# Patient Record
Sex: FEMALE | Race: WHITE | NOT HISPANIC OR LATINO
[De-identification: names, ages, dates, MRNs, and addresses within clinical notes are randomized per-mention and may not be internally consistent; named-entity substitution may affect disease eponyms.]

---

## 2021-04-29 PROBLEM — Z00.00 ENCOUNTER FOR PREVENTIVE HEALTH EXAMINATION: Status: ACTIVE | Noted: 2021-04-29

## 2021-05-06 ENCOUNTER — APPOINTMENT (OUTPATIENT)
Dept: GYNECOLOGIC ONCOLOGY | Facility: CLINIC | Age: 41
End: 2021-05-06

## 2021-10-14 ENCOUNTER — APPOINTMENT (OUTPATIENT)
Dept: GYNECOLOGIC ONCOLOGY | Facility: CLINIC | Age: 41
End: 2021-10-14
Payer: COMMERCIAL

## 2021-10-14 ENCOUNTER — TRANSCRIPTION ENCOUNTER (OUTPATIENT)
Age: 41
End: 2021-10-14

## 2021-10-14 VITALS
HEART RATE: 116 BPM | WEIGHT: 272 LBS | HEIGHT: 63 IN | BODY MASS INDEX: 48.2 KG/M2 | OXYGEN SATURATION: 98 % | DIASTOLIC BLOOD PRESSURE: 93 MMHG | SYSTOLIC BLOOD PRESSURE: 136 MMHG

## 2021-10-14 DIAGNOSIS — Z78.9 OTHER SPECIFIED HEALTH STATUS: ICD-10-CM

## 2021-10-14 DIAGNOSIS — Z80.3 FAMILY HISTORY OF MALIGNANT NEOPLASM OF BREAST: ICD-10-CM

## 2021-10-14 DIAGNOSIS — N93.9 ABNORMAL UTERINE AND VAGINAL BLEEDING, UNSPECIFIED: ICD-10-CM

## 2021-10-14 PROCEDURE — 99204 OFFICE O/P NEW MOD 45 MIN: CPT

## 2021-10-19 NOTE — CHIEF COMPLAINT
[Spouse] : spouse [FreeTextEntry1] : [facility]\par [place] Office\par [address] [facility]\[place] [address]\par

## 2021-10-19 NOTE — END OF VISIT
[FreeTextEntry3] : Written by Senait Maya PA-C, acting as a scribe for Dr. Preston Moses.\par This note accurately reflects the work and decisions made by me.\par

## 2021-10-19 NOTE — PHYSICAL EXAM
[Normal] : Bimanual Exam: Normal [de-identified] : Patient was seen and examined with chaperone Senait Maya PA-C.

## 2021-10-19 NOTE — HISTORY OF PRESENT ILLNESS
[FreeTextEntry1] : 42 yo  via 2 C/S LMP 10/3/21 self referred for ovarian cyst, LSIL. Patient reports she had presented for her annual exam, pap smear from  with ASCUS, colposcopy with 12:00 biopsy with endocervical and squamous mucosa with moderate chronic inflammation, reactive changes, and focal atypia, cannot entirely rule out low-grade squamous intraepithelial lesion (LSIL). ECC with endocervical mucosa with moderate to severe acute inflammation, and reactive changes. EMB non diagnostic. US performed 21 with right ovarian cyst measuring 4.5 x 3.4 x 3 cm. She had seen Dr Antoine ([place]) who recommended surgical removal of ovarian cyst with FS possible laparotomy. She has not had tumor markers drawn, CA-125, CEA and CA 19-9 ordered and has appointment Saturday 10/16. She reports starting in February her menses have been irregular, she reports menses used to occur every 28-30 days with 4-6 days of bleeding, but starting in February she had very heavy vaginal bleeding, with consistent VB x 4 weeks followed by 1.5 months without bleeding. \par \par Patient reports history of ovarian cysts diagnosed 25 years ago. \par \par Lpap: ASCUS\par Lmammo: 3/2020 WNL per pt, she has script\par

## 2021-10-19 NOTE — ASSESSMENT
[FreeTextEntry1] : 42 yo female with R ovarian cyst on US from 4/2021 and LSIL on cervical biopsy. Discussed in regards to her cervical dysplasia she should have repeat pap smear in 6 months-1 year. In regards to her ovarian cyst, I would like to evaluate with MRI as US is outdated and unclear in terms of morphology. She has tumor marker script and will have labs drawn this Saturday 10/16/21. She will bring the result with her when she follows up in 1-2 weeks. Patient expressed her concern with her previous physician and is hesitant to have surgery. I advised her based on MRI results I may be recommending a similar if not identical procedure as was previously recommended to her. She understands there may be a need for discussion regarding surgical intervention in the near future, depending on results from MRI.

## 2021-11-08 ENCOUNTER — APPOINTMENT (OUTPATIENT)
Dept: MRI IMAGING | Facility: CLINIC | Age: 41
End: 2021-11-08
Payer: COMMERCIAL

## 2021-11-08 ENCOUNTER — OUTPATIENT (OUTPATIENT)
Dept: OUTPATIENT SERVICES | Facility: HOSPITAL | Age: 41
LOS: 1 days | End: 2021-11-08

## 2021-11-08 DIAGNOSIS — N93.9 ABNORMAL UTERINE AND VAGINAL BLEEDING, UNSPECIFIED: ICD-10-CM

## 2021-11-08 PROCEDURE — 72197 MRI PELVIS W/O & W/DYE: CPT | Mod: 26

## 2021-11-11 PROBLEM — R87.610 ATYPICAL SQUAMOUS CELLS OF UNDETERMINED SIGNIFICANCE (ASCUS) ON PAPANICOLAOU SMEAR OF CERVIX: Status: ACTIVE | Noted: 2021-10-14

## 2021-11-12 ENCOUNTER — APPOINTMENT (OUTPATIENT)
Dept: GYNECOLOGIC ONCOLOGY | Facility: CLINIC | Age: 41
End: 2021-11-12
Payer: COMMERCIAL

## 2021-11-12 VITALS
WEIGHT: 272 LBS | RESPIRATION RATE: 18 BRPM | DIASTOLIC BLOOD PRESSURE: 119 MMHG | SYSTOLIC BLOOD PRESSURE: 156 MMHG | HEIGHT: 63 IN | BODY MASS INDEX: 48.2 KG/M2 | OXYGEN SATURATION: 95 % | HEART RATE: 93 BPM

## 2021-11-12 DIAGNOSIS — R87.610 ATYPICAL SQUAMOUS CELLS OF UNDETERMINED SIGNIFICANCE ON CYTOLOGIC SMEAR OF CERVIX (ASC-US): ICD-10-CM

## 2021-11-12 PROCEDURE — 99214 OFFICE O/P EST MOD 30 MIN: CPT

## 2021-11-19 ENCOUNTER — APPOINTMENT (OUTPATIENT)
Dept: GYNECOLOGIC ONCOLOGY | Facility: CLINIC | Age: 41
End: 2021-11-19
Payer: COMMERCIAL

## 2021-11-19 PROCEDURE — 99214 OFFICE O/P EST MOD 30 MIN: CPT

## 2021-11-22 NOTE — PHYSICAL EXAM
[Normal] : Mood and affect: Normal [de-identified] : Karla Juan Medical assistant chaperoned during results and discussion.

## 2021-11-22 NOTE — ASSESSMENT
[FreeTextEntry1] : I discussed with patient her MRI and blood test results, Patients blood test was wnl. I reviewed patient MRI and advised her that she has a right complex cyst and I am recommending surgical intervention. Options of surgical intervention included Lap BS right oophorectomy vs RA TLH BS, right oophorectomy. I advised the patient that if she just removes her tubes and right ovary and there is an abnormality, she will likely need to proceed with a hysterectomy. I recommended patient think about these options and return in 1 week to discuss final decision. Patient stated she understood and agreed to comply.

## 2021-11-22 NOTE — HISTORY OF PRESENT ILLNESS
[FreeTextEntry1] : 42 yo female with R ovarian cyst on US from 4/2021 and LSIL on cervical biopsy. Discussed w/ pt in regards to her cervical dysplasia she should have repeat pap smear in 6 months-1 year. In regards to her ovarian cyst, I wanted to evaluate with MRI as US is outdated and unclear in terms of morphology. She had tumor marker script and was planned to have labs drawn 10/16/21. She was advised to bring in results with her when she followed up. Patient expressed her concern with her previous physician and is hesitant to have surgery. I advised her based on MRI results I may be recommending a similar if not identical procedure as was previously recommended to her. She understood there may be a need for discussion regarding surgical intervention in the near future, depending on results from MRI. Patient returns to the office today to discuss results. \par \par MRI pelvis -11/8/21 revealed 4.0 x 3.7 x 4.2cm complex right ovarian cystic mass with evidence of enhancing peripheral nodularity along the superior margin. There is no evidence of ascites or peritoneal implants. No difinable pelvic adenopathy seen. \par Tumor markers: 10/16/21 CEA <0.5. Ca19-9: 3 Ca125: 25\par \par

## 2021-11-22 NOTE — REASON FOR VISIT
[FreeTextEntry1] : [place] Location \par \par [facility] [place]. [phone]\par [address] [place] [address]

## 2021-11-22 NOTE — END OF VISIT
[FreeTextEntry3] : Written by Karla Juan, acting as a scribe for Dr. Preston Moses \par This note accurately reflects the work and decisions made by me.

## 2021-11-29 NOTE — REASON FOR VISIT
[Spouse] : spouse [FreeTextEntry1] : [place] Location \par \par [facility] [place]. [phone]\par [address] [place] [address]

## 2021-11-29 NOTE — HISTORY OF PRESENT ILLNESS
[FreeTextEntry1] : This 42 y/o w/ a right ovarian complex cyst was recently seen on 11/12/21 to review MRI pelvis and blood test results. MRI pelvis -11/8/21 revealed 4.0 x 3.7 x 4.2cm complex right ovarian cystic mass with evidence of enhancing peripheral nodularity along the superior margin. There is no evidence of ascites or peritoneal implants. No difinable pelvic adenopathy seen. Tumor markers: 10/16/21 CEA <0.5. Ca 19-9: 3 Ca 125: 25. I discussed with patient the need for surgical intervention w/ the option of a Lap BS, right oophorectomy w/ the possible need of further surgical intervention if pathology is abnormal vs a RA TLH BS right oophorectomy. Patient had previously  expressed her concern with her previous physician and is hesitant to have surgery. I advised the patient to think over her options, patient returns to the office today to discuss her final decision. \par \par

## 2021-11-29 NOTE — PHYSICAL EXAM
[Normal] : Mood and affect: Normal [de-identified] : Karla Juan Medical assistant chaperoned during discussion.

## 2021-11-29 NOTE — ASSESSMENT
[FreeTextEntry1] : I discussed at length with the patient the nature, purpose, risks, benefits, and alternatives of Robot assisted total laparoscopic hysterectomy with bilateral salpingectomy, right oophorectomy, possible left oophorectomy, and possible staging (including possible omentectomy).  The patient understands the risks to include,but not be limited to, bowel injury, bleeding (with the possible need for transfusion), bladder or ureteral injury, infections, deep venous thrombosis, and navjot-operative death.  The patient also understands that her surgery may not be able to be performed robotically and that she may need a laparotomy.  She also understands the limitations of robotic surgery and the possibility of missing a surgical complication with need for subsequent re-exploration.  She agrees to proceed.  She asked numerous questions which were answered to her satisfaction.  She understands the need for a pre-operative bowel preparation and agrees to comply with our instructions.  She also understands the rationale for a cystoscopy at the completion of the procedure and the potential risks of cystoscopy.  The patient also understands the possible limitations of robotic staging compared to a laparotomy approach. \par \par Discussed with patient how I like to incorporate a "family email" which consists of a family member or friend of the patient emailing me a short synopsis of who this patient is as a person, their likes or dislikes, their favorite song or movie, etc. This email will be read out loud in the operating room and it has allowed my operating team to connect with the patient on a more personal level. Patient was given my email address.

## 2021-12-03 ENCOUNTER — FORM ENCOUNTER (OUTPATIENT)
Age: 41
End: 2021-12-03

## 2021-12-04 ENCOUNTER — OUTPATIENT (OUTPATIENT)
Dept: OUTPATIENT SERVICES | Facility: HOSPITAL | Age: 41
LOS: 1 days | End: 2021-12-04

## 2021-12-04 VITALS
DIASTOLIC BLOOD PRESSURE: 90 MMHG | HEIGHT: 64 IN | WEIGHT: 281.97 LBS | OXYGEN SATURATION: 99 % | RESPIRATION RATE: 16 BRPM | SYSTOLIC BLOOD PRESSURE: 130 MMHG | TEMPERATURE: 98 F | HEART RATE: 96 BPM

## 2021-12-04 DIAGNOSIS — Z98.890 OTHER SPECIFIED POSTPROCEDURAL STATES: Chronic | ICD-10-CM

## 2021-12-04 DIAGNOSIS — Z29.9 ENCOUNTER FOR PROPHYLACTIC MEASURES, UNSPECIFIED: ICD-10-CM

## 2021-12-04 DIAGNOSIS — Z98.891 HISTORY OF UTERINE SCAR FROM PREVIOUS SURGERY: Chronic | ICD-10-CM

## 2021-12-04 DIAGNOSIS — Z92.29 PERSONAL HISTORY OF OTHER DRUG THERAPY: ICD-10-CM

## 2021-12-04 DIAGNOSIS — R19.00 INTRA-ABDOMINAL AND PELVIC SWELLING, MASS AND LUMP, UNSPECIFIED SITE: ICD-10-CM

## 2021-12-04 DIAGNOSIS — Z13.89 ENCOUNTER FOR SCREENING FOR OTHER DISORDER: ICD-10-CM

## 2021-12-04 DIAGNOSIS — Z98.51 TUBAL LIGATION STATUS: Chronic | ICD-10-CM

## 2021-12-04 DIAGNOSIS — Z01.818 ENCOUNTER FOR OTHER PREPROCEDURAL EXAMINATION: ICD-10-CM

## 2021-12-04 DIAGNOSIS — E66.01 MORBID (SEVERE) OBESITY DUE TO EXCESS CALORIES: ICD-10-CM

## 2021-12-04 LAB
A1C WITH ESTIMATED AVERAGE GLUCOSE RESULT: 5.7 % — HIGH (ref 4–5.6)
ANION GAP SERPL CALC-SCNC: 11 MMOL/L — SIGNIFICANT CHANGE UP (ref 5–17)
APTT BLD: 30.8 SEC — SIGNIFICANT CHANGE UP (ref 27.5–35.5)
BASOPHILS # BLD AUTO: 0.03 K/UL — SIGNIFICANT CHANGE UP (ref 0–0.2)
BASOPHILS NFR BLD AUTO: 0.3 % — SIGNIFICANT CHANGE UP (ref 0–2)
BLD GP AB SCN SERPL QL: SIGNIFICANT CHANGE UP
BUN SERPL-MCNC: 13.4 MG/DL — SIGNIFICANT CHANGE UP (ref 8–20)
CALCIUM SERPL-MCNC: 9.7 MG/DL — SIGNIFICANT CHANGE UP (ref 8.6–10.2)
CHLORIDE SERPL-SCNC: 102 MMOL/L — SIGNIFICANT CHANGE UP (ref 98–107)
CO2 SERPL-SCNC: 24 MMOL/L — SIGNIFICANT CHANGE UP (ref 22–29)
CREAT SERPL-MCNC: 0.83 MG/DL — SIGNIFICANT CHANGE UP (ref 0.5–1.3)
EOSINOPHIL # BLD AUTO: 0.01 K/UL — SIGNIFICANT CHANGE UP (ref 0–0.5)
EOSINOPHIL NFR BLD AUTO: 0.1 % — SIGNIFICANT CHANGE UP (ref 0–6)
ESTIMATED AVERAGE GLUCOSE: 117 MG/DL — HIGH (ref 68–114)
GLUCOSE SERPL-MCNC: 112 MG/DL — HIGH (ref 70–99)
HCG SERPL-ACNC: <4 MIU/ML — SIGNIFICANT CHANGE UP
HCT VFR BLD CALC: 41.2 % — SIGNIFICANT CHANGE UP (ref 34.5–45)
HGB BLD-MCNC: 13 G/DL — SIGNIFICANT CHANGE UP (ref 11.5–15.5)
IMM GRANULOCYTES NFR BLD AUTO: 0.3 % — SIGNIFICANT CHANGE UP (ref 0–1.5)
INR BLD: 0.93 RATIO — SIGNIFICANT CHANGE UP (ref 0.88–1.16)
LYMPHOCYTES # BLD AUTO: 1.91 K/UL — SIGNIFICANT CHANGE UP (ref 1–3.3)
LYMPHOCYTES # BLD AUTO: 16.2 % — SIGNIFICANT CHANGE UP (ref 13–44)
MCHC RBC-ENTMCNC: 26.4 PG — LOW (ref 27–34)
MCHC RBC-ENTMCNC: 31.6 GM/DL — LOW (ref 32–36)
MCV RBC AUTO: 83.7 FL — SIGNIFICANT CHANGE UP (ref 80–100)
MONOCYTES # BLD AUTO: 0.6 K/UL — SIGNIFICANT CHANGE UP (ref 0–0.9)
MONOCYTES NFR BLD AUTO: 5.1 % — SIGNIFICANT CHANGE UP (ref 2–14)
NEUTROPHILS # BLD AUTO: 9.17 K/UL — HIGH (ref 1.8–7.4)
NEUTROPHILS NFR BLD AUTO: 78 % — HIGH (ref 43–77)
PLATELET # BLD AUTO: 303 K/UL — SIGNIFICANT CHANGE UP (ref 150–400)
POTASSIUM SERPL-MCNC: 4 MMOL/L — SIGNIFICANT CHANGE UP (ref 3.5–5.3)
POTASSIUM SERPL-SCNC: 4 MMOL/L — SIGNIFICANT CHANGE UP (ref 3.5–5.3)
PROTHROM AB SERPL-ACNC: 10.8 SEC — SIGNIFICANT CHANGE UP (ref 10.6–13.6)
RBC # BLD: 4.92 M/UL — SIGNIFICANT CHANGE UP (ref 3.8–5.2)
RBC # FLD: 13.9 % — SIGNIFICANT CHANGE UP (ref 10.3–14.5)
SODIUM SERPL-SCNC: 137 MMOL/L — SIGNIFICANT CHANGE UP (ref 135–145)
WBC # BLD: 11.76 K/UL — HIGH (ref 3.8–10.5)
WBC # FLD AUTO: 11.76 K/UL — HIGH (ref 3.8–10.5)

## 2021-12-04 RX ORDER — ESCITALOPRAM OXALATE 10 MG/1
0 TABLET, FILM COATED ORAL
Qty: 0 | Refills: 0 | DISCHARGE

## 2021-12-04 RX ORDER — ERGOCALCIFEROL 1.25 MG/1
0 CAPSULE ORAL
Qty: 0 | Refills: 0 | DISCHARGE

## 2021-12-04 RX ORDER — ONDANSETRON 8 MG/1
0 TABLET, FILM COATED ORAL
Qty: 0 | Refills: 0 | DISCHARGE

## 2021-12-04 RX ORDER — SCOPALAMINE 1 MG/3D
0 PATCH, EXTENDED RELEASE TRANSDERMAL
Qty: 0 | Refills: 1 | DISCHARGE

## 2021-12-04 NOTE — H&P PST ADULT - NSANTHOSAYNRD_GEN_A_CORE
No. GERHARD screening performed.  STOP BANG Legend: 0-2 = LOW Risk; 3-4 = INTERMEDIATE Risk; 5-8 = HIGH Risk

## 2021-12-04 NOTE — H&P PST ADULT - PULMONARY EMBOLUS
Chief Complaint   Patient presents with   • Consultation     np consult. Pt would like to discuss Warfarin. Discuss rash under breast. Discuss spot on right arm that wont heal.        History Of Present Illness  Nia is a 87 year old female presenting  For established care.  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  Has history of pulmonary embolism & emphysema currently on home oxygen.  On warfarin.  Stated that she do have history of vena cava filter.  Last INR was 2.1 and she is taking Coumadin.  Of high blood pressure her blood pressure is mildly elevated but stated it is usually in the range of 130 / 70 at home.  Currently denied any chest pain.  Patient is concerned about a rash under her breasts, itchy, stated the rash under her left breast is more prominent than the right one ..  There is a  small lesion on her right forearm concerned about it.  That she has history of skipped beats and following cardiologist at [place] but currently denied any heart issues like coronary artery disease or any history of CHF heart attack.  Stated  that his respiratory status is well controlled with the inhalers.            Past Medical History  Past Medical History:   Diagnosis Date   • Abdominal tumor    • Emphysema of lung (CMS/HCC)    • Gastric ulcer    • Hypertension    • Osteoporosis    • Pulmonary embolism (CMS/HCC)         Surgical History  Past Surgical History:   Procedure Laterality Date   • Eye surgery     • Stent implant     • Tonsillectomy and adenoidectomy          Social History  Social History     Tobacco Use   • Smoking status: Former Smoker   • Smokeless tobacco: Never Used   Substance Use Topics   • Alcohol use: Yes     Frequency: 4 or more times a week     Drinks per session: 1 or 2     Binge frequency: Never   • Drug use: Never       Family History  History reviewed. No pertinent family history.     Allergies  ALLERGIES:  Patient has no known  allergies.    Medications  Current Outpatient Medications   Medication   • ProAir RespiClick 108 (90 Base) MCG/ACT inhaler   • ALPRAZolam (XANAX) 0.25 MG tablet   • Trelegy Ellipta 100-62.5-25 MCG/INH inhaler   • losartan-hydrochlorothiazide (HYZAAR) 100-25 MG per tablet   • montelukast (SINGULAIR) 10 MG tablet   • pantoprazole (PROTONIX) 40 MG tablet   • warfarin (COUMADIN) 1 MG tablet (warfarin)   • warfarin (COUMADIN) 3 MG tablet   • Ferrous Sulfate (Iron) 325 (65 Fe) MG Tab   • vitamin B-12 (CYANOCOBALAMIN) 1000 MCG tablet   • Thiamine HCl (vitamin B-1) 100 MG tablet   • Ascorbic Acid (vitamin C) 500 MG tablet   • Cholecalciferol (vitamin D3) 10 mcg (400 units) tablet   • clotrimazole-betamethasone (LOTRISONE) 1-0.05 % cream     No current facility-administered medications for this visit.        There is no problem list on file for this patient.       Review of Systems    CONSTITUTIONAL: No unwanted weight change , fever , chills or malaise .  EYES: No change in vision , blurred or double vision.  ENT:No problem  hearing,chewing, swallowing.  RESPIRATORY:No cough CV:No chest pain or pressure,GR,Orthopnea, PND ,or palpitations.  GI:No change in bowel habits, no nausea ,vomiting or diarrhea  :No dysuria, hematuria, nocturia,urgency or frequency  MUSCULO-SKELETAL:hx of arthritis   SKIN:No rash  CNS:No problems with  Baseline co ordination , balance , vision , strength, numbness or tingling .  PSYCH:No depression /anxiety. Suicidal thoughts.    Last Recorded Vitals    Blood pressure (!) 162/60, pulse 64, temperature 98.6 °F (37 °C), temperature source Temporal, resp. rate 14, height 5' 5\" (1.651 m), weight 90.7 kg (200 lb).  Physical  General:Well oriented in time /place /person    HEENT: Conjunctiva clearLUNGS:LUNGS CLEAR , NORMAL BREATH SOUNDS , No wheezing ,rales or Rhonchi noted .  CARDIOVASCULAR:RRR, Heart sounds normal   ABDOMEN: Abdomen Soft , non tender , positive bowel sounds in all quadrant noted    EXTREMITIES:No clubbing , cyanosis or edema noted  MUSCULOSKELETAL:ROM restricted at back   SKIN:UNder left breast , there is erythematous rash , mild rash under rt breast also noted .   NEUROLOGICAL:Intact , Mental status normal , Gait normal     Assessment and Plan:    Encounter to establish care  Med hx reviewed     Pulmonary emphysema, unspecified emphysema type (CMS/HCC)  rec   - SERVICE TO PULMONARY MEDICINE  - CBC WITH DIFFERENTIAL  - COMPREHENSIVE METABOLIC PANEL    Skipped heart beats  rec cardiology   - SERVICE TO CARDIOLOGY    Other pulmonary embolism without acute cor pulmonale, unspecified chronicity (CMS/[facility])    - SERVICE TO PULMONARY MEDICINE  - POCT PT/INR    Essential hypertension  bp log book     Skin lesion  rec vaseline     Rash  Add clotrimazole / betamethasone   - SERVICE TO DERMATOLOGY  INR: INR TODAY 2 , CONTINUE CURRENT DOSE   RECHECK IN ONE WEEK  Home bound needs  inr monitoring .           Time spent with patient 40  minutes.Duration of counseling and/or coordination of care (greater than 50%).Specifically,we discussed the patient's diagnosis,prognosis,and treatment/management options as documented in the A/P.    Vinnie Pendleton MD  Internal Medicine  [facility]  Phone: [phone]  Fax: [phone]   no

## 2021-12-04 NOTE — H&P PST ADULT - ASSESSMENT
This is a  This is a morbidly obese 41 year old female in [place]   LMP 21 with history of ovarian cyst, presents today for PST c/o irregular menses and abnormal PAP smear. Patient states about 1.5 years ago she started having irregular menstrual cycles. Reports  prior menses used to occur every 28-30 days with 4-6 days of bleeding, but starting in February she had very heavy vaginal bleeding, lasting  4 weeks followed by 1.5 months without bleeding. States over the summer her cycle started to become more regulated. Patient reports during her annual exam, her pap smear was positive for ASCUS. A colposcopy with biopsy performed 2021 , states results were inconclusive. MRI pelvis 21 IMPRESSION: 4.0 x 3.7 x 4.2cm complex right ovarian cystic mass with evidence of enhancing peripheral nodularity along the superior margin. There is no evidence of ascites or peritoneal implants. No definable pelvic adenopathy seen. Denies current vaginal bleeding. Denies pelvic pain, dyspareunia or post coital bleeding. She is now scheduled for robotic assisted total laparoscopic, hysterectomy, bilateral salpingectomy, right oophorectomy, frozen section, possible left oophorectomy, cystoscopy with Dr. Moses on 21.     CAPRINI SCORE    AGE RELATED RISK FACTORS                                                             [X ] Age 41-60 years                                            (1 Point)  [ ] Age: 61-74 years                                           (2 Points)                 [ ] Age= 75 years                                                (3 Points)             DISEASE RELATED RISK FACTORS                                                       [ ] Edema in the lower extremities                 (1 Point)                     [ ] Varicose veins                                               (1 Point)                                 [X ] BMI > 25 Kg/m2                                            (1 Point)                                  [ ] Serious infection (ie PNA)                            (1 Point)                     [ ] Lung disease ( COPD, Emphysema)            (1 Point)                                                                          [ ] Acute myocardial infarction                         (1 Point)                  [ ] Congestive heart failure (in the previous month)  (1 Point)         [ ] Inflammatory bowel disease                            (1 Point)                  [ ] Central venous access, PICC or Port               (2 points)       (within the last month)                                                                [ ] Stroke (in the previous month)                        (5 Points)    [ ] Previous or present malignancy                       (2 points)                                                                                                                                                         HEMATOLOGY RELATED FACTORS                                                         [ ] Prior episodes of VTE                                     (3 Points)                     [ ] Positive family history for VTE                      (3 Points)                  [ ] Prothrombin 50003 A                                     (3 Points)                     [ ] Factor V Leiden                                                (3 Points)                        [ ] Lupus anticoagulants                                      (3 Points)                                                           [ ] Anticardiolipin antibodies                              (3 Points)                                                       [ ] High homocysteine in the blood                   (3 Points)                                             [ ] Other congenital or acquired thrombophilia      (3 Points)                                                [ ] Heparin induced thrombocytopenia                  (3 Points)                                        MOBILITY RELATED FACTORS  [ ] Bed rest                                                         (1 Point)  [ ] Plaster cast                                                    (2 points)  [ ] Bed bound for more than 72 hours           (2 Points)    GENDER SPECIFIC FACTORS  [ ] Pregnancy or had a baby within the last month   (1 Point)  [ ] Post-partum < 6 weeks                                   (1 Point)  [ ] Hormonal therapy  or oral contraception   (1 Point)  [ ] History of pregnancy complications              (1 point)  [ ] Unexplained or recurrent              (1 Point)    OTHER RISK FACTORS                                           (1 Point)  [ X] BMI >40, smoking, diabetes requiring insulin, chemotherapy  blood transfusions and length of surgery over 2 hours    SURGERY RELATED RISK FACTORS  [ ]  Section within the last month     (1 Point)  [ ] Minor surgery                                                  (1 Point)  [ ] Arthroscopic surgery                                       (2 Points)  [X ] Planned major surgery lasting more            (2 Points)      than 45 minutes     [ ] Elective hip or knee joint replacement       (5 points)       surgery                                                TRAUMA RELATED RISK FACTORS  [ ] Fracture of the hip, pelvis, or leg                       (5 Points)  [ ] Spinal cord injury resulting in paralysis             (5 points)       (in the previous month)    [ ] Paralysis  (less than 1 month)                             (5 Points)  [ ] Multiple Trauma within 1 month                        (5 Points)    Total Score [     5   ]    Caprini Score 0-2: Low Risk, NO VTE prophylaxis required for most patients, encourage ambulation  Caprini Score 3-6: Moderate Risk , pharmacologic VTE prophylaxis is indicated for most patients (in the absence of contraindications)  Caprini Score Greater than or =7: High risk, pharmocologic VTE prophylaxis indicated for most patients (in the absence of contraindications)    OPIOID RISK TOOL    CHANCE EACH BOX THAT APPLIES AND ADD TOTALS AT THE END    FAMILY HISTORY OF SUBSTANCE ABUSE                 FEMALE         MALE                                                Alcohol                             [  ]1 pt          [  ]3pts                                               Illegal Durgs                     [  ]2 pts        [  ]3pts                                               Rx Drugs                           [  ]4 pts        [  ]4 pts    PERSONAL HISTORY OF SUBSTANCE ABUSE                                                                                          Alcohol                             [  ]3 pts       [  ]3 pts                                               Illegal Drugs                     [  ]4 pts        [  ]4 pts                                               Rx Drugs                           [  ]5 pts        [  ]5 pts    AGE BETWEEN 16-45 YEARS                                      [X  ]1 pt         [  ]1 pt    HISTORY OF PREADOLESCENT   SEXUAL ABUSE                                                             [  ]3 pts        [  ]0pts    PSYCHOLOGICAL DISEASE                     ADD, OCD, Bipolar, Schizophrenia        [  ]2 pts         [  ]2 pts                      Depression                                               [  ]1 pt           [  ]1 pt           SCORING TOTAL   (add numbers and type here)              (**1*)                                     A score of 3 or lower indicated LOW risk for future opioid abuse  A score of 4 to 7 indicated moderate risk for future opioid abuse  A score of 8 or higher indicates a high risk for opioid abuse

## 2021-12-04 NOTE — H&P PST ADULT - NSICDXPASTSURGICALHX_GEN_ALL_CORE_FT
PAST SURGICAL HISTORY:  H/O  section     H/O tubal ligation      PAST SURGICAL HISTORY:  H/O  section x2    H/O tubal ligation     History of oral surgery

## 2021-12-04 NOTE — H&P PST ADULT - NSICDXFAMILYHX_GEN_ALL_CORE_FT
FAMILY HISTORY:  FH: breast cancer, maternal grandmother     FAMILY HISTORY:  FH: breast cancer, maternal grandmother    Father  Still living? Unknown  Family history of DVT, Age at diagnosis: Age Unknown  FH: diabetes mellitus, Age at diagnosis: Age Unknown  FH: heart disease, Age at diagnosis: Age Unknown  FH: HTN (hypertension), Age at diagnosis: Age Unknown    Mother  Still living? Unknown  FH: diabetes mellitus, Age at diagnosis: Age Unknown  FH: HTN (hypertension), Age at diagnosis: Age Unknown

## 2021-12-04 NOTE — H&P PST ADULT - NSICDXPASTMEDICALHX_GEN_ALL_CORE_FT
PAST MEDICAL HISTORY:  History of ovarian cyst 25 years ago     PAST MEDICAL HISTORY:  Exposure to COVID-19 virus Oct 2021 no s/s    History of ovarian cyst 25 years ago

## 2021-12-04 NOTE — H&P PST ADULT - HISTORY OF PRESENT ILLNESS
41 year old female   LMP   presents today for PST c/o irregular menses and abnormal PAP smear.   self referred for ovarian cyst, LSIL. Patient reports she had presented for her annual exam, pap smear from  with ASCUS, colposcopy with 12:00 biopsy with endocervical and squamous mucosa with moderate chronic inflammation, reactive changes, and focal atypia, cannot entirely rule out low-grade squamous intraepithelial lesion (LSIL). ECC with endocervical mucosa with moderate to severe acute inflammation, and reactive changes. EMB non diagnostic. US performed 21 with right ovarian cyst measuring 4.5 x 3.4 x 3 cm. She had seen Dr Antoine ([place]) who recommended surgical removal of ovarian cyst with FS possible laparotomy. She has not had tumor markers drawn, CA-125, CEA and CA 19-9 ordered and has appointment Saturday 10/16. She reports starting in February her menses have been irregular, she reports menses used to occur every 28-30 days with 4-6 days of bleeding, but starting in February she had very heavy vaginal bleeding, with consistent VB x 4 weeks followed by 1.5 months without bleeding   41 year old female   LMP   presents today for PST c/o irregular menses and abnormal PAP smear. Patient states about 1.5 years ago she started having a irregular menstrual cycle. States up until then she would have reports menses used to occur every 28-30 days with 4-6 days of bleeding, but starting in February she had very heavy vaginal bleeding, with consistent VB x 4 weeks followed by 1.5 months without bleeding . Over the summer her cycle started to seem more regulated  Patient reports she had presented for her annual exam, pap smear from  with ASCUS,  colposcopy with biopsy 2021 , states results were inconclusive.   with endocervical and squamous mucosa with moderate chronic inflammation, reactive changes, and focal atypia, cannot entirely rule out low-grade squamous intraepithelial lesion (LSIL). ECC with endocervical mucosa with moderate to severe acute inflammation, and reactive changes. EMB non diagnostic. US performed 21 with right ovarian cyst measuring 4.5 x 3.4 x 3 cm. She had seen Dr Antoine ([place]) who recommended surgical removal of ovarian cyst with FS possible laparotomy. She has not had tumor markers drawn, CA-125, CEA and CA 19-9 ordered and has appointment Saturday 10/16. She reports starting in February her menses have been irregular, she   currently not bleeding, no pelvic, no [id number] no post coital bleeding  41 year old female   LMP 21 with history of ovarian cyst, presents today for PST c/o irregular menses and abnormal PAP smear. Patient states about 1.5 years ago she started having irregular menstrual cycles. Reports  prior menses used to occur every 28-30 days with 4-6 days of bleeding, but starting in February she had very heavy vaginal bleeding, lasting  4 weeks followed by 1.5 months without bleeding. States over the summer her cycle started to become more regulated. Patient reports during her annual exam, her pap smear was positive for ASCUS. A colposcopy with biopsy performed 2021 , states results were inconclusive. MRI pelvis 21 IMPRESSION: 4.0 x 3.7 x 4.2cm complex right ovarian cystic mass with evidence of enhancing peripheral nodularity along the superior margin. There is no evidence of ascites or peritoneal implants. No definable pelvic adenopathy seen. Denies current vaginal bleeding. Denies pelvic pain, dyspareunia or post coital bleeding. She is now scheduled for robotic assisted total laparoscopic, hysterectomy, bilateral salpingectomy, right oophorectomy, frozen section, possible left oophorectomy, cystoscopy with Dr. Moses on 21.

## 2021-12-04 NOTE — ASU PATIENT PROFILE, ADULT - 
ADDITIONAL INFORMATION
Nursing Note by Shereen Whatley RMA at 03/01/18 10:59 AM     Author:  Shereen Whatley RMA Service:  (none) Author Type:  Certified Medical Assistant     Filed:  03/01/18 11:03 AM Encounter Date:  3/1/2018 Status:  Signed     :  Shereen Whatley RMA (Certified Medical Assistant)            Shave bx[JK1.1M] wound care provided to patient. He/she expressed understanding. All questions were answered. Name and phone number provided if needed.   Time out involving the provider, staff and patient in exam room performed prior to procedure.[JK1.1T]  Electronically Signed by:    JOSE J Pritchett , 3/1/2018[JK1.2T]        Revision History        User Key Date/Time User Provider Type Action    > JK1.2 03/01/18 11:03 AM Shereen Whatley RMA Certified Medical Assistant Sign     JK1.1 03/01/18 10:59 AM Shereen Whatley RMA Certified Medical Assistant     M - Manual, T - Template             pre-op covid swab 12/04 outsourced

## 2022-02-03 ENCOUNTER — OUTPATIENT (OUTPATIENT)
Dept: OUTPATIENT SERVICES | Facility: HOSPITAL | Age: 42
LOS: 1 days | End: 2022-02-03
Payer: COMMERCIAL

## 2022-02-03 VITALS
TEMPERATURE: 98 F | HEIGHT: 64 IN | OXYGEN SATURATION: 95 % | WEIGHT: 279.11 LBS | HEART RATE: 86 BPM | RESPIRATION RATE: 86 BRPM | DIASTOLIC BLOOD PRESSURE: 70 MMHG | SYSTOLIC BLOOD PRESSURE: 120 MMHG

## 2022-02-03 DIAGNOSIS — Z98.51 TUBAL LIGATION STATUS: Chronic | ICD-10-CM

## 2022-02-03 DIAGNOSIS — Z29.9 ENCOUNTER FOR PROPHYLACTIC MEASURES, UNSPECIFIED: ICD-10-CM

## 2022-02-03 DIAGNOSIS — Z98.891 HISTORY OF UTERINE SCAR FROM PREVIOUS SURGERY: Chronic | ICD-10-CM

## 2022-02-03 DIAGNOSIS — Z01.818 ENCOUNTER FOR OTHER PREPROCEDURAL EXAMINATION: ICD-10-CM

## 2022-02-03 DIAGNOSIS — R19.00 INTRA-ABDOMINAL AND PELVIC SWELLING, MASS AND LUMP, UNSPECIFIED SITE: ICD-10-CM

## 2022-02-03 DIAGNOSIS — Z98.890 OTHER SPECIFIED POSTPROCEDURAL STATES: Chronic | ICD-10-CM

## 2022-02-03 LAB
A1C WITH ESTIMATED AVERAGE GLUCOSE RESULT: 5.5 % — SIGNIFICANT CHANGE UP (ref 4–5.6)
ALBUMIN SERPL ELPH-MCNC: 4.3 G/DL — SIGNIFICANT CHANGE UP (ref 3.3–5.2)
ALP SERPL-CCNC: 60 U/L — SIGNIFICANT CHANGE UP (ref 40–120)
ALT FLD-CCNC: 16 U/L — SIGNIFICANT CHANGE UP
ANION GAP SERPL CALC-SCNC: 11 MMOL/L — SIGNIFICANT CHANGE UP (ref 5–17)
APTT BLD: 30.7 SEC — SIGNIFICANT CHANGE UP (ref 27.5–35.5)
AST SERPL-CCNC: 17 U/L — SIGNIFICANT CHANGE UP
BILIRUB SERPL-MCNC: 0.3 MG/DL — LOW (ref 0.4–2)
BLD GP AB SCN SERPL QL: SIGNIFICANT CHANGE UP
BUN SERPL-MCNC: 14.1 MG/DL — SIGNIFICANT CHANGE UP (ref 8–20)
CALCIUM SERPL-MCNC: 9.5 MG/DL — SIGNIFICANT CHANGE UP (ref 8.6–10.2)
CHLORIDE SERPL-SCNC: 104 MMOL/L — SIGNIFICANT CHANGE UP (ref 98–107)
CO2 SERPL-SCNC: 23 MMOL/L — SIGNIFICANT CHANGE UP (ref 22–29)
CREAT SERPL-MCNC: 0.84 MG/DL — SIGNIFICANT CHANGE UP (ref 0.5–1.3)
ESTIMATED AVERAGE GLUCOSE: 111 MG/DL — SIGNIFICANT CHANGE UP (ref 68–114)
GLUCOSE SERPL-MCNC: 87 MG/DL — SIGNIFICANT CHANGE UP (ref 70–99)
HCG SERPL-ACNC: <4 MIU/ML — SIGNIFICANT CHANGE UP
HCT VFR BLD CALC: 41.6 % — SIGNIFICANT CHANGE UP (ref 34.5–45)
HGB BLD-MCNC: 13.3 G/DL — SIGNIFICANT CHANGE UP (ref 11.5–15.5)
INR BLD: 0.95 RATIO — SIGNIFICANT CHANGE UP (ref 0.88–1.16)
MCHC RBC-ENTMCNC: 26.3 PG — LOW (ref 27–34)
MCHC RBC-ENTMCNC: 32 GM/DL — SIGNIFICANT CHANGE UP (ref 32–36)
MCV RBC AUTO: 82.2 FL — SIGNIFICANT CHANGE UP (ref 80–100)
PLATELET # BLD AUTO: 333 K/UL — SIGNIFICANT CHANGE UP (ref 150–400)
POTASSIUM SERPL-MCNC: 4.4 MMOL/L — SIGNIFICANT CHANGE UP (ref 3.5–5.3)
POTASSIUM SERPL-SCNC: 4.4 MMOL/L — SIGNIFICANT CHANGE UP (ref 3.5–5.3)
PROT SERPL-MCNC: 7.6 G/DL — SIGNIFICANT CHANGE UP (ref 6.6–8.7)
PROTHROM AB SERPL-ACNC: 11.1 SEC — SIGNIFICANT CHANGE UP (ref 10.6–13.6)
RBC # BLD: 5.06 M/UL — SIGNIFICANT CHANGE UP (ref 3.8–5.2)
RBC # FLD: 14.5 % — SIGNIFICANT CHANGE UP (ref 10.3–14.5)
SODIUM SERPL-SCNC: 138 MMOL/L — SIGNIFICANT CHANGE UP (ref 135–145)
WBC # BLD: 9.07 K/UL — SIGNIFICANT CHANGE UP (ref 3.8–10.5)
WBC # FLD AUTO: 9.07 K/UL — SIGNIFICANT CHANGE UP (ref 3.8–10.5)

## 2022-02-03 PROCEDURE — G0463: CPT

## 2022-02-03 RX ORDER — CEFAZOLIN SODIUM 1 G
3000 VIAL (EA) INJECTION ONCE
Refills: 0 | Status: DISCONTINUED | OUTPATIENT
Start: 2022-02-08 | End: 2022-02-22

## 2022-02-03 RX ORDER — METRONIDAZOLE 500 MG
500 TABLET ORAL ONCE
Refills: 0 | Status: DISCONTINUED | OUTPATIENT
Start: 2022-02-08 | End: 2022-02-22

## 2022-02-03 RX ORDER — SODIUM CHLORIDE 9 MG/ML
3 INJECTION INTRAMUSCULAR; INTRAVENOUS; SUBCUTANEOUS ONCE
Refills: 0 | Status: DISCONTINUED | OUTPATIENT
Start: 2022-02-08 | End: 2022-02-22

## 2022-02-03 NOTE — H&P PST ADULT - NSICDXPASTMEDICALHX_GEN_ALL_CORE_FT
PAST MEDICAL HISTORY:  Exposure to COVID-19 virus Oct 2021 no s/s  Tested positive in 12/2021    History of ovarian cyst 21   nyears ago

## 2022-02-03 NOTE — H&P PST ADULT - NSICDXFAMILYHX_GEN_ALL_CORE_FT
FAMILY HISTORY:  FH: breast cancer, maternal grandmother    Father  Still living? Unknown  Family history of DVT, Age at diagnosis: Age Unknown  FH: diabetes mellitus, Age at diagnosis: Age Unknown  FH: heart disease, Age at diagnosis: Age Unknown  FH: HTN (hypertension), Age at diagnosis: Age Unknown    Mother  Still living? Unknown  FH: diabetes mellitus, Age at diagnosis: Age Unknown  FH: HTN (hypertension), Age at diagnosis: Age Unknown

## 2022-02-03 NOTE — H&P PST ADULT - PROBLEM SELECTOR PLAN 2
Pt is scheduled for robotic assisted total laparoscopic, hysterectomy, bilateral salpingectomy, right oophorectomy, frozen section, possible left oophorectomy, cystoscopy with Dr. Moses on 2/8/22.    Pt requires medical clearance for procedure

## 2022-02-03 NOTE — ASU PATIENT PROFILE, ADULT - FALL HARM RISK - UNIVERSAL INTERVENTIONS
Bed in lowest position, wheels locked, appropriate side rails in place/Call bell, personal items and telephone in reach/Instruct patient to call for assistance before getting out of bed or chair/Non-slip footwear when patient is out of bed/[place] to call system/Physically safe environment - no spills, clutter or unnecessary equipment/Purposeful Proactive Rounding/Room/bathroom lighting operational, light cord in reach

## 2022-02-03 NOTE — H&P PST ADULT - ASSESSMENT
42 year old female  LMP  with history of ovarian cyst, presents today for PST c/o irregular menses and abnormal PAP smear. Patient states about 1.5 years ago she started having irregular menstrual cycles. Reports prior menses used to occur every 28-30 days with 4-6 days of bleeding, but starting in 2021  she had very heavy vaginal bleeding, lasting 4 weeks followed by 1.5 months without bleeding. States over the summer her cycle started to become more regulated. Patient reports during her annual exam, her pap smear was positive for ASCUS. A colposcopy with biopsy performed 2021 , pt states results were inconclusive. MRI pelvis 21 IMPRESSION: 4.0 x 3.7 x 4.2cm complex right ovarian cystic mass with evidence of enhancing peripheral nodularity along the superior margin. There is no evidence of ascites or peritoneal implants. No definable pelvic adenopathy seen. Denies current vaginal bleeding. Denies pelvic pain, dyspareunia or post coital bleeding. She is now scheduled for robotic assisted total laparoscopic, hysterectomy, bilateral salpingectomy, right oophorectomy, frozen section, possible left oophorectomy, cystoscopy with Dr. Moses on 22.    Patient was educated on preoperative preperation with written and verbal instruction . Patient is going for medical clearance with Dr Lopez patient will review medications with PCP. Patient was educated on aspirin and aspirin products NSAIDS ,vitamins and herbals that increase the risk of bleeding and need to be stopped five days before procedure  . Patient was also educated on covid testing and covid prevention ,social distancing and wearing a mask.     Covid + 2021  paperwork in chart    OPIOID RISK TOOL    CHANCE EACH BOX THAT APPLIES AND ADD TOTALS AT THE END    FAMILY HISTORY OF SUBSTANCE ABUSE                 FEMALE         MALE                                                Alcohol                             [  ]1 pt          [  ]3pts                                               Illegal Durgs                     [  ]2 pts        [  ]3pts                                               Rx Drugs                           [  ]4 pts        [  ]4 pts    PERSONAL HISTORY OF SUBSTANCE ABUSE                                                                                          Alcohol                             [  ]3 pts       [  ]3 pts                                               Illegal Drugs                     [  ]4 pts        [  ]4 pts                                               Rx Drugs                           [  ]5 pts        [  ]5 pts    AGE BETWEEN 16-45 YEARS                                      [  ]1 pt         [  ]1 pt    HISTORY OF PREADOLESCENT   SEXUAL ABUSE                                                             [  ]3 pts        [  ]0pts    PSYCHOLOGICAL DISEASE                     ADD, OCD, Bipolar, Schizophrenia        [  ]2 pts         [  ]2 pts                      Depression                                               [  ]1 pt           [  ]1 pt           SCORING TOTAL   (add numbers and type here)              (0)                                     CAPRINI SCORE [CLOT]    AGE RELATED RISK FACTORS                                                       MOBILITY RELATED FACTORS  [ x] Age 41-60 years                                            (1 Point)                  [ ] Bed rest                                                        (1 Point)  [ ] Age: 61-74 years                                           (2 Points)                 [ ] Plaster cast                                                   (2 Points)  [ ] Age= 75 years                                              (3 Points)                 [ ] Bed bound for more than 72 hours                 (2 Points)    DISEASE RELATED RISK FACTORS                                               GENDER SPECIFIC FACTORS  [ ] Edema in the lower extremities                       (1 Point)                  [ ] Pregnancy                                                     (1 Point)  [ ] Varicose veins                                               (1 Point)                  [ ] Post-partum < 6 weeks                                   (1 Point)             [ x] BMI > 25 Kg/m2                                            (1 Point)                  [ ] Hormonal therapy  or oral contraception          (1 Point)                 [ ] Sepsis (in the previous month)                        (1 Point)                  [ ] History of pregnancy complications                 (1 point)  [ ] Pneumonia or serious lung disease                                               [ ] Unexplained or recurrent                     (1 Point)           (in the previous month)                               (1 Point)  [ ] Abnormal pulmonary function test                     (1 Point)                 SURGERY RELATED RISK FACTORS  [ ] Acute myocardial infarction                              (1 Point)                 [ ]  Section                                             (1 Point)  [ ] Congestive heart failure (in the previous month)  (1 Point)               [ ] Minor surgery                                                  (1 Point)   [ ] Inflammatory bowel disease                             (1 Point)                 [ ] Arthroscopic surgery                                        (2 Points)  [ ] Central venous access                                      (2 Points)                [ ]x General surgery lasting more than 45 minutes   (2 Points)       [ ] Stroke (in the previous month)                          (5 Points)               [ ] Elective arthroplasty                                         (5 Points)                                                                                                                                               HEMATOLOGY RELATED FACTORS                                                 TRAUMA RELATED RISK FACTORS  [ ] Prior episodes of VTE                                     (3 Points)                [ ] Fracture of the hip, pelvis, or leg                       (5 Points)  [ ] Positive family history for VTE                         (3 Points)                 [ ] Acute spinal cord injury (in the previous month)  (5 Points)  [ ] Prothrombin 46499 A                                     (3 Points)                 [ ] Paralysis  (less than 1 month)                             (5 Points)  [ ] Factor V Leiden                                             (3 Points)                  [ ] Multiple Trauma within 1 month                        (5 Points)  [ ] Lupus anticoagulants                                     (3 Points)                                                           [ ] Anticardiolipin antibodies                               (3 Points)                                                       [ ] High homocysteine in the blood                      (3 Points)                                             [ ] Other congenital or acquired thrombophilia      (3 Points)                                                [ ] Heparin induced thrombocytopenia                  (3 Points)                                          Total Score [         4 ]

## 2022-02-03 NOTE — ASU PATIENT PROFILE, ADULT - HEALTH/HEALTHCARE ANXIETIES, PROFILE
Stable, 71 y.o M POD# 1 s/p Nasal Endoscopy, Debridement nasal cavity, Removal hardware nasal cavity, Removal bone graft, complex closure nasal dorsum. Admitted to ENT for overnight observation and SpO2 Monitoring. Doing well today,  No acute events overnihght, awaiting D/C home, will F/U with Dr. Delgadillo as an outpatient.      none

## 2022-02-03 NOTE — H&P PST ADULT - HISTORY OF PRESENT ILLNESS
42 year old female  LMP  with history of ovarian cyst, presents today for PST c/o irregular menses and abnormal PAP smear. Patient states about 1.5 years ago she started having irregular menstrual cycles. Reports prior menses used to occur every 28-30 days with 4-6 days of bleeding, but starting in 2021  she had very heavy vaginal bleeding, lasting 4 weeks followed by 1.5 months without bleeding. States over the summer her cycle started to become more regulated. Patient reports during her annual exam, her pap smear was positive for ASCUS. A colposcopy with biopsy performed 2021 , pt states results were inconclusive. MRI pelvis 21 IMPRESSION: 4.0 x 3.7 x 4.2cm complex right ovarian cystic mass with evidence of enhancing peripheral nodularity along the superior margin. There is no evidence of ascites or peritoneal implants. No definable pelvic adenopathy seen. Denies current vaginal bleeding. Denies pelvic pain, dyspareunia or post coital bleeding. She is now scheduled for robotic assisted total laparoscopic, hysterectomy, bilateral salpingectomy, right oophorectomy, frozen section, possible left oophorectomy, cystoscopy with Dr. Moses on 22.

## 2022-02-08 ENCOUNTER — RESULT REVIEW (OUTPATIENT)
Age: 42
End: 2022-02-08

## 2022-02-08 ENCOUNTER — OUTPATIENT (OUTPATIENT)
Dept: OUTPATIENT SERVICES | Facility: HOSPITAL | Age: 42
LOS: 1 days | End: 2022-02-08
Payer: COMMERCIAL

## 2022-02-08 VITALS
DIASTOLIC BLOOD PRESSURE: 95 MMHG | OXYGEN SATURATION: 99 % | HEART RATE: 94 BPM | HEIGHT: 64 IN | WEIGHT: 279.11 LBS | RESPIRATION RATE: 16 BRPM | TEMPERATURE: 99 F | SYSTOLIC BLOOD PRESSURE: 136 MMHG

## 2022-02-08 VITALS
RESPIRATION RATE: 12 BRPM | HEART RATE: 72 BPM | SYSTOLIC BLOOD PRESSURE: 135 MMHG | DIASTOLIC BLOOD PRESSURE: 89 MMHG | OXYGEN SATURATION: 99 %

## 2022-02-08 DIAGNOSIS — Z98.891 HISTORY OF UTERINE SCAR FROM PREVIOUS SURGERY: Chronic | ICD-10-CM

## 2022-02-08 DIAGNOSIS — R19.00 INTRA-ABDOMINAL AND PELVIC SWELLING, MASS AND LUMP, UNSPECIFIED SITE: ICD-10-CM

## 2022-02-08 DIAGNOSIS — Z98.51 TUBAL LIGATION STATUS: Chronic | ICD-10-CM

## 2022-02-08 DIAGNOSIS — Z98.890 OTHER SPECIFIED POSTPROCEDURAL STATES: Chronic | ICD-10-CM

## 2022-02-08 LAB
GLUCOSE BLDC GLUCOMTR-MCNC: 110 MG/DL — HIGH (ref 70–99)
GLUCOSE BLDC GLUCOMTR-MCNC: 118 MG/DL — HIGH (ref 70–99)
GLUCOSE BLDC GLUCOMTR-MCNC: 94 MG/DL — SIGNIFICANT CHANGE UP (ref 70–99)

## 2022-02-08 PROCEDURE — 88307 TISSUE EXAM BY PATHOLOGIST: CPT | Mod: 26

## 2022-02-08 PROCEDURE — 58552 LAPARO-VAG HYST INCL T/O: CPT

## 2022-02-08 PROCEDURE — 88307 TISSUE EXAM BY PATHOLOGIST: CPT

## 2022-02-08 PROCEDURE — 88305 TISSUE EXAM BY PATHOLOGIST: CPT | Mod: 26

## 2022-02-08 PROCEDURE — 88112 CYTOPATH CELL ENHANCE TECH: CPT | Mod: 26,59

## 2022-02-08 PROCEDURE — S2900: CPT

## 2022-02-08 PROCEDURE — 88331 PATH CONSLTJ SURG 1 BLK 1SPC: CPT | Mod: 26

## 2022-02-08 PROCEDURE — 88331 PATH CONSLTJ SURG 1 BLK 1SPC: CPT

## 2022-02-08 PROCEDURE — 36415 COLL VENOUS BLD VENIPUNCTURE: CPT

## 2022-02-08 PROCEDURE — 58571 TLH W/T/O 250 G OR LESS: CPT | Mod: AS

## 2022-02-08 PROCEDURE — 58571 TLH W/T/O 250 G OR LESS: CPT

## 2022-02-08 PROCEDURE — 88112 CYTOPATH CELL ENHANCE TECH: CPT | Mod: 26

## 2022-02-08 PROCEDURE — 82962 GLUCOSE BLOOD TEST: CPT

## 2022-02-08 RX ORDER — IBUPROFEN 200 MG
1 TABLET ORAL
Qty: 0 | Refills: 0 | DISCHARGE

## 2022-02-08 RX ORDER — ENOXAPARIN SODIUM 100 MG/ML
40 INJECTION SUBCUTANEOUS ONCE
Refills: 0 | Status: COMPLETED | OUTPATIENT
Start: 2022-02-08 | End: 2022-02-08

## 2022-02-08 RX ORDER — SODIUM CHLORIDE 9 MG/ML
1000 INJECTION, SOLUTION INTRAVENOUS
Refills: 0 | Status: DISCONTINUED | OUTPATIENT
Start: 2022-02-08 | End: 2022-02-08

## 2022-02-08 RX ORDER — FENTANYL CITRATE 50 UG/ML
25 INJECTION INTRAVENOUS
Refills: 0 | Status: DISCONTINUED | OUTPATIENT
Start: 2022-02-08 | End: 2022-02-08

## 2022-02-08 RX ORDER — ACETAMINOPHEN 500 MG
975 TABLET ORAL ONCE
Refills: 0 | Status: COMPLETED | OUTPATIENT
Start: 2022-02-08 | End: 2022-02-08

## 2022-02-08 RX ADMIN — FENTANYL CITRATE 25 MICROGRAM(S): 50 INJECTION INTRAVENOUS at 15:45

## 2022-02-08 RX ADMIN — Medication 975 MILLIGRAM(S): at 11:06

## 2022-02-08 RX ADMIN — ENOXAPARIN SODIUM 40 MILLIGRAM(S): 100 INJECTION SUBCUTANEOUS at 17:10

## 2022-02-08 RX ADMIN — FENTANYL CITRATE 25 MICROGRAM(S): 50 INJECTION INTRAVENOUS at 15:30

## 2022-02-08 RX ADMIN — FENTANYL CITRATE 25 MICROGRAM(S): 50 INJECTION INTRAVENOUS at 15:25

## 2022-02-08 NOTE — ASU DISCHARGE PLAN (ADULT/PEDIATRIC) - ASU DC SPECIAL INSTRUCTIONSFT
A PA will contact you in one week to check on your postop recovery  Please follow-up with Dr. Moses in two weeks for postop check and review pathology report.    Please contact your provider for any pain uncontrolled by medication, excessive bleeding or Fever>100.4  Please take naproxen-1 tablet every 12 hours for at least 3 days, may take percocet as prescribed for breakthrough pain.    May walk and climb stairs as often as youd like, no vigorous activity, do not lift anything greater than 10lbs, nothing per vagina x 6 weeks, do not drive while on pain medication.

## 2022-02-08 NOTE — BRIEF OPERATIVE NOTE - NSICDXBRIEFPROCEDURE_GEN_ALL_CORE_FT
PROCEDURES:  Hysterectomy, total, robot-assisted, laparoscopic, using da Laura Xi, with bilateral salpingectomy and cystoscopy 08-Feb-2022 14:28:55  Preston Lam  Right oophorectomy 08-Feb-2022 14:29:09  Preston Lam

## 2022-02-08 NOTE — PROGRESS NOTE ADULT - SUBJECTIVE AND OBJECTIVE BOX
GYNECOLOGIC ONCOLOGY PROGRESS NOTE    POD#    PROBLEMS:      Pt seen and examined at bedside.     SUBJECTIVE:    Patient is without complaints.  Pain well-controlled.  Flatus:  Denies Nausea, Vomiting or Diarrhea.  Denies shortness of breath, chest pain or dyspnea on exertion.  Tolerating diet.    OBJECTIVE:     VITALS:  T(F): 97.6 (02-08-22 @ 15:06), Max: 99.1 (02-08-22 @ 10:42)  HR: 72 (02-08-22 @ 16:15) (72 - 94)  BP: 135/89 (02-08-22 @ 16:15) (126/88 - 143/90)  RR: 12 (02-08-22 @ 16:15) (12 - 18)  SpO2: 99% (02-08-22 @ 16:15) (96% - 99%)      I&O's Summary    08 Feb 2022 07:01  -  08 Feb 2022 17:18  --------------------------------------------------------  IN: 0 mL / OUT: 400 mL / NET: -400 mL        MEDICATIONS  (STANDING):  ceFAZolin   IVPB 3000 milliGRAM(s) IV Intermittent Once  lactated ringers. 1000 milliLiter(s) (75 mL/Hr) IV Continuous <Continuous>  metroNIDAZOLE  IVPB 500 milliGRAM(s) IV Intermittent Once  sodium chloride 0.9% lock flush 3 milliLiter(s) IV Push Once    MEDICATIONS  (PRN):  fentaNYL    Injectable 25 MICROGram(s) IV Push every 5 minutes PRN Moderate Pain (4 - 6)      Physical Exam:  Constitutional: NAD  Pulmonary: clear to auscultation bilaterally   Cardiovascular: Regular rate and rhythm   Abdomen: soft, non-tender, non-distended, normal bowel signs  Extremities: no lower extremity edema or calve tenderness, Elise's sign negative.  Incision: Clean, dry, intact.  Without signs of infection or hernia.      LABS:                RADIOLOGY & ADDITIONAL TESTS:

## 2022-02-08 NOTE — BRIEF OPERATIVE NOTE - OPERATION/FINDINGS
Small 8wk sized uterus, sigmoid adhered to left uterine cornua and left adnexa. Left ovary with simple cyst and grossly normal. Omental adhesions to anterior abdominal wall. Grossly normal bladder uroepithelium. Bilateral ureteral jets visualized

## 2022-02-08 NOTE — ASU DISCHARGE PLAN (ADULT/PEDIATRIC) - NS MD DC FALL RISK RISK
For information on Fall & Injury Prevention, visit: [email or web address] OR  [email or web address] OR  https://www.cdc.gov/steadi/patient.html

## 2022-02-08 NOTE — ASU DISCHARGE PLAN (ADULT/PEDIATRIC) - CARE PROVIDER_API CALL
Preston Moses)  Gynecologic Oncology; Obstetrics and Gynecology  [address] [place] [address]  Phone: [phone]  Fax: [phone]  Follow Up Time:

## 2022-02-14 ENCOUNTER — NON-APPOINTMENT (OUTPATIENT)
Age: 42
End: 2022-02-14

## 2022-02-14 PROBLEM — Z20.822 CONTACT WITH AND (SUSPECTED) EXPOSURE TO COVID-19: Chronic | Status: ACTIVE | Noted: 2021-12-04

## 2022-02-14 PROBLEM — Z87.42 PERSONAL HISTORY OF OTHER DISEASES OF THE FEMALE GENITAL TRACT: Chronic | Status: ACTIVE | Noted: 2021-12-04

## 2022-02-14 LAB — SURGICAL PATHOLOGY STUDY: SIGNIFICANT CHANGE UP

## 2022-02-23 ENCOUNTER — APPOINTMENT (OUTPATIENT)
Dept: GYNECOLOGIC ONCOLOGY | Facility: CLINIC | Age: 42
End: 2022-02-23
Payer: COMMERCIAL

## 2022-02-23 VITALS
RESPIRATION RATE: 16 BRPM | OXYGEN SATURATION: 95 % | HEIGHT: 63 IN | BODY MASS INDEX: 48.2 KG/M2 | DIASTOLIC BLOOD PRESSURE: 92 MMHG | HEART RATE: 111 BPM | WEIGHT: 272 LBS | SYSTOLIC BLOOD PRESSURE: 140 MMHG

## 2022-02-23 PROCEDURE — 99024 POSTOP FOLLOW-UP VISIT: CPT

## 2022-02-28 LAB — NON-GYNECOLOGICAL CYTOLOGY STUDY: SIGNIFICANT CHANGE UP

## 2022-02-28 NOTE — DISCUSSION/SUMMARY
[Erythema] : was not erythematous [Ecchymosis] : was not ecchymotic [Seroma] : had no seroma [Firm] : soft [Tender] : nontender [Abnormal Bowel Sounds] : normal bowel sounds [Rebound] : no rebound tenderness [Guarding] : no guarding [Incisional Hernia] : no incisional hernia [Mass] : no palpable mass [External Genitalia Abnormal] : normal external genitalia [Vaginal Exam Abnormal] : normal vaginal exam [de-identified] : Karla Juan Medical assistant chaperoned during gynecologic exam.  [FreeTextEntry1] : Pertinent findings revealed small anteverted uterus, omental adhesions to the anterior abdominal wall, left ovary adherent to the sigmoid colon, normal upper abdomen, normal cystoscopy, patient had high airway pressures due to positioning and pneumoperitoneum so decision was made to minimize the procedure done and no remove the left ovary pending final pathology and not perform a surgical staging. \par \par Final pathology: reviewed.

## 2022-02-28 NOTE — REASON FOR VISIT
[Spouse] : spouse [de-identified] : 2/8/22 [de-identified] : Tommy Robot Assisted Total Laparoscopic Hysterectomy, Bilateral Salpingectomy, Right oophorectomy, PW, FS at [facility] for right pelvic mass.  [de-identified] : Patient has recovered well from her surgery, Denies any SOB, abnormal pain or VB. Bowel and bladder function are wnl. Patient states she feels well from a gynecological stand point.

## 2022-02-28 NOTE — ASSESSMENT
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 I discussed the option of going back to the OR to remove her other ovary but explained that I am not in favor of this option given health dangers long term given patients age. I advised them that less then one percent of people with borderline tumors develop a cancer. I am recommending monitoring patient with ultrasounds in my office with Ca125 blood test to be done prior to every visit. I advised the patient we will also obtain a second opinion from [facility] on her pathology. Patient stated she understood and agreed to comply.

## 2022-03-22 ENCOUNTER — FORM ENCOUNTER (OUTPATIENT)
Age: 42
End: 2022-03-22

## 2022-03-23 ENCOUNTER — NON-APPOINTMENT (OUTPATIENT)
Age: 42
End: 2022-03-23

## 2022-04-07 NOTE — H&P PST ADULT - LAB RESULTS AND INTERPRETATION
I have personally seen and examined the patient. I have collaborated with and supervised the labs reviewed and will be sent to PCP for review.  NR NP-BC 2/2/2022 [id number]

## 2022-05-11 ENCOUNTER — APPOINTMENT (OUTPATIENT)
Dept: GYNECOLOGIC ONCOLOGY | Facility: CLINIC | Age: 42
End: 2022-05-11
Payer: COMMERCIAL

## 2022-05-16 LAB — CANCER AG125 SERPL-ACNC: 20 U/ML

## 2022-05-18 ENCOUNTER — APPOINTMENT (OUTPATIENT)
Dept: GYNECOLOGIC ONCOLOGY | Facility: CLINIC | Age: 42
End: 2022-05-18
Payer: COMMERCIAL

## 2022-05-18 PROCEDURE — 76830 TRANSVAGINAL US NON-OB: CPT | Mod: 59

## 2022-05-18 PROCEDURE — 76857 US EXAM PELVIC LIMITED: CPT | Mod: 59

## 2022-05-18 PROCEDURE — 99212 OFFICE O/P EST SF 10 MIN: CPT | Mod: 25

## 2022-05-18 NOTE — ASSESSMENT
[FreeTextEntry1] : Ultrasound performed in office today revealed UT absent\par RTO absent\par LTO 1.6 x 1.1 x 1.5 cm\par \par very gassy, partially seen left ovary\par \par Discussed with patient that her US today was wnl. Advised patient to follow up in September for a follow up ultrasound with a Ca125 done prior to visit.

## 2022-05-18 NOTE — PHYSICAL EXAM
[Normal] : Mood and affect: Normal [FreeTextEntry1] : Karla Juan Medical assistant was present during results and discussion.

## 2022-05-18 NOTE — REASON FOR VISIT
[FreeTextEntry1] : [place] Location \par \par [facility] [place] [phone]\par [address] [place] [id number] \par \par -Borderline tumor. \par -S/p RA TLH BS, R oophorectomy, PW, FS on 2/8/22. \par -Ca125 5/16/22 was 20\par -3 month f/u US

## 2022-05-18 NOTE — HISTORY OF PRESENT ILLNESS
[FreeTextEntry1] : This 41 y/o with a borderline tumor is s/p RA TLH BS right oophorectomy, PW, FS on 2/8/22. [facility] concurred with diagnosis. I had discussed with patient and her  option of removal of left ovary vs observation with f/u ultrasounds. I advised them that I was no in favor of removing her left ovary given her age and health dangers long term. We agreed to monitor patient with ultrasounds and Ca125 blood test. She returns to the office today for a follow up ultrasound. Patient denies any pain or VB. Patient states she feels well from a gynecological stand point. \par \par Ca125 5/16/22 was 20

## 2022-09-08 ENCOUNTER — NON-APPOINTMENT (OUTPATIENT)
Age: 42
End: 2022-09-08

## 2022-09-12 ENCOUNTER — APPOINTMENT (OUTPATIENT)
Dept: GYNECOLOGIC ONCOLOGY | Facility: CLINIC | Age: 42
End: 2022-09-12

## 2022-09-12 PROCEDURE — 76857 US EXAM PELVIC LIMITED: CPT | Mod: 59

## 2022-09-12 PROCEDURE — 76830 TRANSVAGINAL US NON-OB: CPT | Mod: 59

## 2022-09-12 PROCEDURE — 93976 VASCULAR STUDY: CPT | Mod: 59

## 2022-09-12 PROCEDURE — 99212 OFFICE O/P EST SF 10 MIN: CPT | Mod: 25

## 2022-09-12 NOTE — END OF VISIT
[FreeTextEntry3] : RTC in 3 months for repeat  and US. [FreeTextEntry2] : Karla Juan MA was present the entire duration of the patient interaction and gynecological exam.\par

## 2022-09-12 NOTE — HISTORY OF PRESENT ILLNESS
[FreeTextEntry1] : This 41 y/o previous patient of Dr. Preston Moses w/ a hx of borderline tumor, s/p RA TLH BS, right oophorectomy, PW, FS on 2/8/22. [facility] concurred with diagnosis. Dr. Moses had discussed with patient and her  option of removal of left ovary vs observation with f/u ultrasounds. He advised them that he  was not in favor of removing her left ovary given her age and health dangers long term. They agreed to monitor patient with ultrasounds and Ca125 blood test. \par \par Ca125 9/7/22 was 18 previously 20 in May. \par \par She reports no new gynecologic issues.

## 2022-09-12 NOTE — REASON FOR VISIT
[FreeTextEntry1] : [place] Location \par \par [facility] [place] [phone]\par [address] [place] [address]

## 2022-09-12 NOTE — PHYSICAL EXAM
[Normal] : Mood and affect: Normal [Chaperone Present] : A chaperone was present in the examining room during all aspects of the physical examination [FreeTextEntry1] : Karla Juan MA was present the entire duration of the patient interaction and gynecological exam. [Restricted in physically strenuous activity but ambulatory and able to carry out work of a light or sedentary nature] : Status 1- Restricted in physically strenuous activity but ambulatory and able to carry out work of a light or sedentary nature, e.g., light house work, office work

## 2022-09-12 NOTE — ASSESSMENT
[FreeTextEntry1] : 41 y/o w/ a hx of borderline tumor, s/p RA TLH BS, right oophorectomy, PW, FS on 2/8/22. [facility] concurred with diagnosis. No evidence of disease on todays US. Normal left ovary.\par \par We discussed surveillance with pelvic US for the first year every 3 months and after every 6 months for up to 2 years and then yearly.\par \par Ultrasound performed in office today revealed UT absent. RTO absent. \par LTO WNL 1.7 x 1.7 x 1.7 cm, last time 1.6 stable, no masses or ovarian cysts\par FF absent

## 2022-09-14 ENCOUNTER — APPOINTMENT (OUTPATIENT)
Dept: GYNECOLOGIC ONCOLOGY | Facility: CLINIC | Age: 42
End: 2022-09-14

## 2022-12-08 LAB
CANCER AG125 SERPL-ACNC: 16 U/ML
CANCER AG125 SERPL-ACNC: 18 U/ML

## 2022-12-12 ENCOUNTER — APPOINTMENT (OUTPATIENT)
Dept: GYNECOLOGIC ONCOLOGY | Facility: CLINIC | Age: 42
End: 2022-12-12

## 2022-12-12 VITALS
HEART RATE: 85 BPM | BODY MASS INDEX: 49.61 KG/M2 | HEIGHT: 63 IN | SYSTOLIC BLOOD PRESSURE: 127 MMHG | DIASTOLIC BLOOD PRESSURE: 86 MMHG | OXYGEN SATURATION: 97 % | WEIGHT: 280 LBS

## 2022-12-12 PROCEDURE — 76830 TRANSVAGINAL US NON-OB: CPT | Mod: 59

## 2022-12-12 PROCEDURE — 99212 OFFICE O/P EST SF 10 MIN: CPT | Mod: 25

## 2022-12-12 NOTE — END OF VISIT
[FreeTextEntry3] : Follow up in 4 months for US and pre-clinic  [FreeTextEntry2] : Maira Ibarra MA was present the entire duration of the patient interaction and gynecological exam.\par

## 2022-12-12 NOTE — HISTORY OF PRESENT ILLNESS
[FreeTextEntry1] : Pt is a 43 y/o w/ a hx of borderline tumor, s/p RA TLH BS, right oophorectomy, PW, FS on 2/8/22. [facility] concurred with diagnosis.  12/07/22 16, last one 09/07/22: 18\par \par We discussed surveillance with pelvic US for the first year every 3 months and after every 6 months for up to 2 years and then yearly.\par \par Ultrasound performed last visit was 09/12/2022 \par LTO WNL 1.7 x 1.7 x 1.7 cm, last time 1.6 stable, no masses or ovarian cysts\par FF absent. \par \par Interval History: She reports no new gynecologic concerns, no changes in medical/surgical history, no new medications. No bloating, no early satiety, no changes in bowel/blader habits. \par

## 2022-12-12 NOTE — ASSESSMENT
[FreeTextEntry1] : 41 y/o w/ a hx of borderline tumor, s/p RA TLH BS, right oophorectomy, PW, FS on 2/8/22. [facility] concurred with diagnosis. No evidence of disease.  16 on 12/7/22 (previously 18 on 9/7/22).\par \par Ultrasound done in office revealed: \par Absent Uterus\par Absent R ovary\par L ovary: 1.5 x 1.1 x 1.7 cm, stable\par No FF in pelvis

## 2022-12-12 NOTE — PHYSICAL EXAM
[Normal] : Mood and affect: Normal [Chaperone Present] : A chaperone was present in the examining room during all aspects of the physical examination [FreeTextEntry1] : Maira Ibarra MA was present the entire duration of the patient interaction and gynecological exam.

## 2022-12-12 NOTE — REASON FOR VISIT
[FreeTextEntry1] : [place] Location \par [facility] \par  \par [address] \par [facility], [address] \par [phone]\par

## 2023-04-26 ENCOUNTER — APPOINTMENT (OUTPATIENT)
Dept: GYNECOLOGIC ONCOLOGY | Facility: CLINIC | Age: 43
End: 2023-04-26
Payer: COMMERCIAL

## 2023-05-24 LAB — CANCER AG125 SERPL-ACNC: 20 U/ML

## 2023-05-31 ENCOUNTER — NON-APPOINTMENT (OUTPATIENT)
Age: 43
End: 2023-05-31

## 2023-05-31 ENCOUNTER — APPOINTMENT (OUTPATIENT)
Dept: GYNECOLOGIC ONCOLOGY | Facility: CLINIC | Age: 43
End: 2023-05-31
Payer: COMMERCIAL

## 2023-05-31 PROCEDURE — 99212 OFFICE O/P EST SF 10 MIN: CPT | Mod: 25

## 2023-05-31 PROCEDURE — 76857 US EXAM PELVIC LIMITED: CPT | Mod: 59

## 2023-05-31 PROCEDURE — 76830 TRANSVAGINAL US NON-OB: CPT | Mod: 59

## 2023-05-31 NOTE — ASSESSMENT
[FreeTextEntry1] : Pt is a 42 y/o w/ a hx of borderline tumor, s/p RA TLH BS, right oophorectomy, PW, FS on 2/8/22. [facility] concurred with diagnosis.  05/24/2023 was a 20 last one was 16 (5/22 was 20), so it remains at baseline and no elevation. No concerning new symptoms and normal Us. We discussed repeating US one more time in 6 months and then return to routine gynecologic care. I would still recommend yearly  after that and follow up if there are new symptoms or elevations.\par \par Ultrasound performed in office today revealed UT absent s/p hysterectomy \par RTO absent \par LTO 2.2 x 2.1 x 1.2cm wnl\par FF absent \par \par

## 2023-05-31 NOTE — REASON FOR VISIT
[FreeTextEntry1] : [place] Location \par [facility] \par  \par [address] \par [facility], [address] \par [phone]\par

## 2023-05-31 NOTE — HISTORY OF PRESENT ILLNESS
[FreeTextEntry1] : Pt is a 42 y/o w/ a hx of borderline tumor, s/p RA TLH BS, right oophorectomy, PW, FS on 2/8/22. [facility] concurred with diagnosis.  05/24/2023 was a 20 last one was 16.  She reports no new gynecologic concerns, no changes in medical/surgical history, no new medications. No bloating, no early satiety, no changes in bowel/blader habits. She presents to office for an ultrasound follow up.  \par \par We discussed surveillance with pelvic US for the first year every 3 months and after every 6 months for up to 2 years and then yearly.\par \par Ultrasound done on last visit on 12/12/2022 revealed: \par Ultrasound done in office revealed: \par Absent Uterus\par Absent R ovary\par L ovary: 1.5 x 1.1 x 1.7 cm, stable\par No FF in pelvis. \par \par \par \par

## 2023-05-31 NOTE — PHYSICAL EXAM
[Chaperone Present] : A chaperone was present in the examining room during all aspects of the physical examination [FreeTextEntry1] : Karla Juan MA was present the entire duration of the patient interaction and gynecological exam. [Normal] : Recent and remote memory: Intact [Restricted in physically strenuous activity but ambulatory and able to carry out work of a light or sedentary nature] : Status 1- Restricted in physically strenuous activity but ambulatory and able to carry out work of a light or sedentary nature, e.g., light house work, office work

## 2023-05-31 NOTE — END OF VISIT
[FreeTextEntry3] : RTC in 6 months for US and repeat  [FreeTextEntry2] : Karla Juan MA was present the entire duration of the patient interaction and gynecological exam.\par

## 2023-10-27 NOTE — BRIEF OPERATIVE NOTE - SPECIMENS
[address] [place] [address] PHYSICAL THERAPY  [address] [place] [address], [address], [place], [address] [place] [address] Ph: [phone] Fx: [phone]  PHYSICAL THERAPY PROGRESS NOTE  Patient Name: Maria Esther Coles : 1955   Treatment/Medical Diagnosis: Other low back pain [M54.59]   Referral Source: Choco Lauren MD     Date of Initial Visit: 2023 Attended Visits: 9 Missed Visits: 0     SUMMARY OF TREATMENT  Pt has undergone a bout of physical therapy consisting of therapeutic exercise, therapeutic activities, neuromuscular re-education, manual therapy, modalities, and patient education     CURRENT STATUS  Pt has attended 9 visits including initial evaluation on 2023. Since beginning PT, pt reports 90% improvement in symptoms, demonstrating improving strength and ROM. Pt continues to have increased pain in the morning/at night after increased activity and when ambulating on compliant surfaces, and still demonstrates decreased strength R>L. Pt would benefit from continued skilled PT to address remaining deficits in order to improve QOL and ability to perform hobbies. Active Movements: [] N/A   [] Too acute   [] Other:    2023 10/27/2023   ROM % AROM Comments:pain, area % AROM   Forward flexion 40-60 75 Reports pain in posterior hip but states minimal 100, tight but no pain      Strength    2023 10/27/2023     L(0-5) R (0-5) L(0-5) R(0-5)   Hip Extension 4+ 3 5 4   Gluteus Jose R 4+ 3- 5 4+   Hip Abduction 3+ 3 4+ 4     Pt will be independent with initial HEP to maximize benefit of PT. Status at IE: NA  Current status: MET, reports compliance with HEP    Pt will report pain no worse than 2/10 to improve ability to get in/out of bed and negotiate stairs in the morning   Status at IE: 8/10 at worst  Current status: Progressing, reporting decreasing intensity and frequency of pain, but can be up to 5-6/10 with increased activity/ambulation on compliant surfaces.      Payor: [employer] / Uterus, Cervix, bilateral fallopian tubes, right ovary

## 2023-11-28 LAB — CANCER AG125 SERPL-ACNC: 17 U/ML

## 2023-11-29 ENCOUNTER — APPOINTMENT (OUTPATIENT)
Dept: GYNECOLOGIC ONCOLOGY | Facility: CLINIC | Age: 43
End: 2023-11-29
Payer: COMMERCIAL

## 2023-11-29 DIAGNOSIS — C56.1 MALIGNANT NEOPLASM OF RIGHT OVARY: ICD-10-CM

## 2023-11-29 PROCEDURE — 99213 OFFICE O/P EST LOW 20 MIN: CPT

## (undated) DEVICE — WRAP COMPRESSION CALF MED

## (undated) DEVICE — PREP CHLORAPREP ORANGE 2PCT 26ML

## (undated) DEVICE — SUT VLOC 180 0 9" GS-21 GREEN

## (undated) DEVICE — RUMI TIP LAVENDER 5.1MMX6CM DISP

## (undated) DEVICE — GLV 7.5 PROTEXIS

## (undated) DEVICE — SOL IRR POUR NS 0.9% 1000ML

## (undated) DEVICE — DRSG OPSITE POSTOP 2.5"X2"

## (undated) DEVICE — FOLEY TRAY 16FR 5CC LF UMETER CLOSED

## (undated) DEVICE — DEVICE PUREVIEW ACTIVE

## (undated) DEVICE — SOL IRR POUR H2O 1000ML

## (undated) DEVICE — RUMI TIP WHITE 6.7MM X 6CM DISP

## (undated) DEVICE — XI DRAPE COLUMN

## (undated) DEVICE — RUMI KOH-EFFICIENT 3.0CM

## (undated) DEVICE — LIGASURE ATLAS 10MM 20CM

## (undated) DEVICE — ELCTR CORD FOOTSWITCH 1PLR LAPSCP 10FT

## (undated) DEVICE — ELCTR GROUNDING PAD ADULT COVIDIEN

## (undated) DEVICE — BLANKET WARMER UPPER ADULT

## (undated) DEVICE — RUMI TIP ORANGE 6.7MMX12CM DISP

## (undated) DEVICE — RUMI TIP BLUE 6.7MM X 8CM DISP

## (undated) DEVICE — DRAPE ROBOTIC

## (undated) DEVICE — XI DRAPE ARM

## (undated) DEVICE — XI SEAL UNIV 5- 8 MM

## (undated) DEVICE — POSITIONER PINK PAD PIGAZZI SYSTEM

## (undated) DEVICE — RUMI TIP GREEN 6.7MMX10CM DISP

## (undated) DEVICE — PACK ROBOTIC

## (undated) DEVICE — PREP BETADINE SPONGE STICKS

## (undated) DEVICE — XI SEALER DA VINCI VESSEL

## (undated) DEVICE — SOL INJ NS 0.9% 100ML

## (undated) DEVICE — BALLOON COLPO-PNEUMO OCCLUDER

## (undated) DEVICE — POSITIONER FOAM EGG CRATE ULNAR (2PCS)

## (undated) DEVICE — LIGASURE BLUNT TIP NANO CTD 37CM

## (undated) DEVICE — COVER TIP INTUITIVE W INSERTION TOOL

## (undated) DEVICE — TROCAR COVIDIEN VERSAONE OPTICAL BLADELESS 5MM

## (undated) DEVICE — SUT VICRYL 4-0 18" PS-2 UNDYED

## (undated) DEVICE — DRSG KERLIX ROLL 4.5"

## (undated) DEVICE — BAG TISSUE RETRIEVAL ROBOTIC STERILE 8MM